# Patient Record
Sex: FEMALE | URBAN - METROPOLITAN AREA
[De-identification: names, ages, dates, MRNs, and addresses within clinical notes are randomized per-mention and may not be internally consistent; named-entity substitution may affect disease eponyms.]

---

## 2024-02-05 ENCOUNTER — OV NP (OUTPATIENT)
Dept: URBAN - METROPOLITAN AREA CLINIC 2 | Facility: CLINIC | Age: 34
End: 2024-02-05

## 2024-05-29 ENCOUNTER — OFFICE VISIT (OUTPATIENT)
Dept: URBAN - METROPOLITAN AREA CLINIC 2 | Facility: CLINIC | Age: 34
End: 2024-05-29

## 2024-06-26 ENCOUNTER — LAB OUTSIDE AN ENCOUNTER (OUTPATIENT)
Dept: URBAN - METROPOLITAN AREA CLINIC 2 | Facility: CLINIC | Age: 34
End: 2024-06-26

## 2024-06-26 ENCOUNTER — DASHBOARD ENCOUNTERS (OUTPATIENT)
Age: 34
End: 2024-06-26

## 2024-06-26 ENCOUNTER — OFFICE VISIT (OUTPATIENT)
Dept: URBAN - METROPOLITAN AREA CLINIC 2 | Facility: CLINIC | Age: 34
End: 2024-06-26
Payer: COMMERCIAL

## 2024-06-26 VITALS
TEMPERATURE: 98.1 F | SYSTOLIC BLOOD PRESSURE: 117 MMHG | WEIGHT: 262.4 LBS | BODY MASS INDEX: 43.72 KG/M2 | HEART RATE: 65 BPM | DIASTOLIC BLOOD PRESSURE: 78 MMHG | HEIGHT: 65 IN

## 2024-06-26 DIAGNOSIS — R19.7 DIARRHEA, UNSPECIFIED TYPE: ICD-10-CM

## 2024-06-26 DIAGNOSIS — R79.89 ELEVATED LFTS: ICD-10-CM

## 2024-06-26 PROCEDURE — 99244 OFF/OP CNSLTJ NEW/EST MOD 40: CPT | Performed by: NURSE PRACTITIONER

## 2024-06-26 RX ORDER — LEVOTHYROXINE SODIUM 125 UG/1
TAKE ONE TABLET BY MOUTH EVERY MORNING TABLET ORAL
Qty: 90 UNSPECIFIED | Refills: 0 | Status: ACTIVE | COMMUNITY

## 2024-06-26 RX ORDER — NORETHINDRONE 0.35 MG/1
TAKE ONE TABLET BY MOUTH ONE TIME DAILY TABLET ORAL
Qty: 84 UNSPECIFIED | Refills: 1 | Status: ACTIVE | COMMUNITY

## 2024-06-26 RX ORDER — METFORMIN HCL 500 MG/1
TAKE ONE TABLET BY MOUTH ONE TIME DAILY IN THE MORNING TABLET, FILM COATED ORAL
Qty: 90 UNSPECIFIED | Refills: 0 | Status: ACTIVE | COMMUNITY

## 2024-06-26 RX ORDER — GABAPENTIN 300 MG/1
TAKE ONE CAPSULE BY MOUTH THREE TIMES A DAY CAPSULE ORAL
Qty: 180 UNSPECIFIED | Refills: 0 | Status: ACTIVE | COMMUNITY

## 2024-06-26 RX ORDER — ERGOCALCIFEROL 1.25 MG/1
TAKE ONE CAPSULE BY MOUTH EVERY WEEK CAPSULE, LIQUID FILLED ORAL
Qty: 12 UNSPECIFIED | Refills: 1 | Status: ACTIVE | COMMUNITY

## 2024-06-26 NOTE — PHYSICAL EXAM GASTROINTESTINAL
Abdomen , soft, obese, nontender, nondistended , no guarding or rigidity , no masses palpable , normal bowel sounds , Liver and Spleen ,unable to palpate r/t body habitus

## 2024-06-26 NOTE — HPI-TODAY'S VISIT:
This patient was referred by Dr. Ray Mendoza for an evaluation of diarrhea and elevated LFTs.  A copy of this will be sent to the referring provider.  Very pleasant 34-year-old female seen today for complaints of diarrhea and for elevated liver enzymes.  Diarrhea began about 2 months ago and can occur 3-5 times per day nothing overnight.  Stool is described as very soft.  She denies any abdominal pain or nausea.  No new medications.  However, she has been on metformin for about 1 year.  She is also on Ozempic.  She does not think she has lost any weight but her blood sugars are better controlled now.  Liver enzymes are also elevated.  Results in chart and reviewed.  She denies alcohol use.  Denies family history of liver disease.

## 2024-07-02 ENCOUNTER — TELEPHONE ENCOUNTER (OUTPATIENT)
Dept: URBAN - METROPOLITAN AREA CLINIC 80 | Facility: CLINIC | Age: 34
End: 2024-07-02

## 2024-07-02 ENCOUNTER — TELEPHONE ENCOUNTER (OUTPATIENT)
Dept: URBAN - METROPOLITAN AREA CLINIC 79 | Facility: CLINIC | Age: 34
End: 2024-07-02

## 2024-07-02 RX ORDER — CIPROFLOXACIN 500 MG/1
1 TABLET TABLET, FILM COATED ORAL
Qty: 20 TABLET | Refills: 0 | OUTPATIENT
Start: 2024-07-02 | End: 2024-07-12

## 2024-07-29 ENCOUNTER — TELEPHONE ENCOUNTER (OUTPATIENT)
Dept: URBAN - METROPOLITAN AREA CLINIC 2 | Facility: CLINIC | Age: 34
End: 2024-07-29

## 2024-08-02 ENCOUNTER — WEB ENCOUNTER (OUTPATIENT)
Dept: URBAN - METROPOLITAN AREA CLINIC 2 | Facility: CLINIC | Age: 34
End: 2024-08-02

## 2024-08-15 ENCOUNTER — OFFICE VISIT (OUTPATIENT)
Dept: URBAN - METROPOLITAN AREA CLINIC 2 | Facility: CLINIC | Age: 34
End: 2024-08-15
Payer: COMMERCIAL

## 2024-08-15 VITALS
DIASTOLIC BLOOD PRESSURE: 87 MMHG | HEART RATE: 71 BPM | SYSTOLIC BLOOD PRESSURE: 132 MMHG | HEIGHT: 65 IN | BODY MASS INDEX: 43.78 KG/M2 | TEMPERATURE: 98.1 F | WEIGHT: 262.8 LBS

## 2024-08-15 DIAGNOSIS — R76.8 ANTIMITOCHONDRIAL ANTIBODY POSITIVE: ICD-10-CM

## 2024-08-15 DIAGNOSIS — R19.7 DIARRHEA, UNSPECIFIED TYPE: ICD-10-CM

## 2024-08-15 DIAGNOSIS — R79.89 ELEVATED LFTS: ICD-10-CM

## 2024-08-15 PROCEDURE — 99213 OFFICE O/P EST LOW 20 MIN: CPT | Performed by: NURSE PRACTITIONER

## 2024-08-15 RX ORDER — METFORMIN HCL 500 MG/1
TAKE ONE TABLET BY MOUTH ONE TIME DAILY IN THE MORNING TABLET, FILM COATED ORAL
Qty: 90 UNSPECIFIED | Refills: 0 | Status: ACTIVE | COMMUNITY

## 2024-08-15 RX ORDER — GABAPENTIN 300 MG/1
TAKE ONE CAPSULE BY MOUTH THREE TIMES A DAY CAPSULE ORAL
Qty: 180 UNSPECIFIED | Refills: 0 | Status: ACTIVE | COMMUNITY

## 2024-08-15 RX ORDER — ERGOCALCIFEROL 1.25 MG/1
TAKE ONE CAPSULE BY MOUTH EVERY WEEK CAPSULE, LIQUID FILLED ORAL
Qty: 12 UNSPECIFIED | Refills: 1 | Status: ACTIVE | COMMUNITY

## 2024-08-15 RX ORDER — NORETHINDRONE 0.35 MG/1
TAKE ONE TABLET BY MOUTH ONE TIME DAILY TABLET ORAL
Qty: 84 UNSPECIFIED | Refills: 1 | Status: ACTIVE | COMMUNITY

## 2024-08-15 RX ORDER — LEVOTHYROXINE SODIUM 125 UG/1
TAKE ONE TABLET BY MOUTH EVERY MORNING TABLET ORAL
Qty: 90 UNSPECIFIED | Refills: 0 | Status: ACTIVE | COMMUNITY

## 2024-08-15 NOTE — PHYSICAL EXAM GASTROINTESTINAL
Abdomen , morbid obesity, soft, nontender, nondistended , no guarding or rigidity , no masses palpable , normal bowel sounds , Liver and Spleen ,unable to palpate d/t body habitus

## 2024-08-15 NOTE — HPI-TODAY'S VISIT:
34-year-old female seen today in follow-up for elevated liver enzymes and diarrhea.  Stool studies indicated an E. coli infection which did resolve.  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  We did provide her with these items.  Liver enzymes were notable for positive AMA.  ALT slightly elevated at 36.  She denies abdominal pain.  She is diabetic and tries to eat low-carb diet.  She follows regularly with her primary care for diabetes and hypothyroidism.  She has follow-up with him next month for labs.

## 2025-02-17 ENCOUNTER — OFFICE VISIT (OUTPATIENT)
Dept: URBAN - METROPOLITAN AREA CLINIC 2 | Facility: CLINIC | Age: 35
End: 2025-02-17

## 2025-02-17 RX ORDER — NORETHINDRONE 0.35 MG/1
TAKE ONE TABLET BY MOUTH ONE TIME DAILY TABLET ORAL
Qty: 84 UNSPECIFIED | Refills: 1 | Status: ACTIVE | COMMUNITY

## 2025-02-17 RX ORDER — METFORMIN HCL 500 MG/1
TAKE ONE TABLET BY MOUTH ONE TIME DAILY IN THE MORNING TABLET, FILM COATED ORAL
Qty: 90 UNSPECIFIED | Refills: 0 | Status: ACTIVE | COMMUNITY

## 2025-02-17 RX ORDER — ERGOCALCIFEROL 1.25 MG/1
TAKE ONE CAPSULE BY MOUTH EVERY WEEK CAPSULE, LIQUID FILLED ORAL
Qty: 12 UNSPECIFIED | Refills: 1 | Status: ACTIVE | COMMUNITY

## 2025-02-17 RX ORDER — LEVOTHYROXINE SODIUM 125 UG/1
TAKE ONE TABLET BY MOUTH EVERY MORNING TABLET ORAL
Qty: 90 UNSPECIFIED | Refills: 0 | Status: ACTIVE | COMMUNITY

## 2025-02-17 RX ORDER — GABAPENTIN 300 MG/1
TAKE ONE CAPSULE BY MOUTH THREE TIMES A DAY CAPSULE ORAL
Qty: 180 UNSPECIFIED | Refills: 0 | Status: ACTIVE | COMMUNITY